# Patient Record
Sex: FEMALE | Race: WHITE | Employment: FULL TIME | URBAN - METROPOLITAN AREA
[De-identification: names, ages, dates, MRNs, and addresses within clinical notes are randomized per-mention and may not be internally consistent; named-entity substitution may affect disease eponyms.]

---

## 2018-07-02 PROBLEM — R01.1 HEART MURMUR: Status: ACTIVE | Noted: 2018-07-02

## 2018-07-02 PROBLEM — F41.9 ANXIETY AND DEPRESSION: Status: ACTIVE | Noted: 2018-07-02

## 2018-07-02 PROBLEM — F32.A ANXIETY AND DEPRESSION: Status: ACTIVE | Noted: 2018-07-02

## 2018-07-02 PROBLEM — Z34.90 PREGNANCY: Status: ACTIVE | Noted: 2018-07-02

## 2018-07-31 PROBLEM — K12.1 MOUTH ULCERS: Status: ACTIVE | Noted: 2018-07-31

## 2018-08-03 PROBLEM — B00.9 HERPES SIMPLEX TYPE 1 INFECTION: Status: ACTIVE | Noted: 2018-08-03

## 2018-12-12 ENCOUNTER — HOSPITAL ENCOUNTER (OUTPATIENT)
Age: 28
Discharge: HOME | End: 2018-12-12
Attending: OBSTETRICS & GYNECOLOGY | Admitting: OBSTETRICS & GYNECOLOGY
Payer: COMMERCIAL

## 2018-12-12 ENCOUNTER — APPOINTMENT (OUTPATIENT)
Dept: ULTRASOUND IMAGING | Age: 28
End: 2018-12-12
Attending: OBSTETRICS & GYNECOLOGY
Payer: COMMERCIAL

## 2018-12-12 VITALS
HEIGHT: 64 IN | TEMPERATURE: 97.9 F | HEART RATE: 127 BPM | RESPIRATION RATE: 112 BRPM | WEIGHT: 145 LBS | SYSTOLIC BLOOD PRESSURE: 144 MMHG | OXYGEN SATURATION: 99 % | BODY MASS INDEX: 24.75 KG/M2 | DIASTOLIC BLOOD PRESSURE: 66 MMHG

## 2018-12-12 DIAGNOSIS — R10.9 FLANK PAIN, ACUTE: Primary | ICD-10-CM

## 2018-12-12 LAB
ALBUMIN SERPL-MCNC: 2.7 G/DL (ref 3.5–5)
ALBUMIN/GLOB SERPL: 0.7 {RATIO}
ALP SERPL-CCNC: 126 U/L (ref 50–130)
ALT SERPL-CCNC: 12 U/L (ref 12–65)
ANION GAP SERPL CALC-SCNC: 13 MMOL/L
APPEARANCE UR: ABNORMAL
AST SERPL-CCNC: 21 U/L (ref 15–37)
BACTERIA URNS QL MICRO: ABNORMAL /HPF
BASOPHILS # BLD: 0 K/UL (ref 0–0.2)
BASOPHILS NFR BLD: 0 % (ref 0–2)
BILIRUB SERPL-MCNC: 0.3 MG/DL (ref 0.2–1.1)
BILIRUB UR QL: NEGATIVE
BUN SERPL-MCNC: 6 MG/DL (ref 6–23)
CALCIUM SERPL-MCNC: 8.7 MG/DL (ref 8.3–10.4)
CHLORIDE SERPL-SCNC: 108 MMOL/L (ref 98–107)
CO2 SERPL-SCNC: 17 MMOL/L (ref 21–32)
COLOR UR: YELLOW
CREAT SERPL-MCNC: 0.5 MG/DL (ref 0.6–1)
DIFFERENTIAL METHOD BLD: ABNORMAL
EOSINOPHIL # BLD: 0.2 K/UL (ref 0–0.8)
EOSINOPHIL NFR BLD: 2 % (ref 0.5–7.8)
EPI CELLS #/AREA URNS HPF: ABNORMAL /HPF
ERYTHROCYTE [DISTWIDTH] IN BLOOD BY AUTOMATED COUNT: 11.9 % (ref 11.9–14.6)
GLOBULIN SER CALC-MCNC: 3.9 G/DL (ref 2.3–3.5)
GLUCOSE SERPL-MCNC: 75 MG/DL (ref 65–100)
GLUCOSE UR STRIP.AUTO-MCNC: NEGATIVE MG/DL
GLUCOSE, GLUUPC: NEGATIVE
HCT VFR BLD AUTO: 31.8 % (ref 35.8–46.3)
HGB BLD-MCNC: 10.8 G/DL (ref 11.7–15.4)
HGB UR QL STRIP: ABNORMAL
IMM GRANULOCYTES # BLD: 0.1 K/UL (ref 0–0.5)
IMM GRANULOCYTES NFR BLD AUTO: 1 % (ref 0–5)
KETONES UR QL STRIP.AUTO: 15 MG/DL
KETONES UR-MCNC: NORMAL MG/DL
LEUKOCYTE ESTERASE UR QL STRIP.AUTO: NEGATIVE
LYMPHOCYTES # BLD: 1.3 K/UL (ref 0.5–4.6)
LYMPHOCYTES NFR BLD: 12 % (ref 13–44)
MCH RBC QN AUTO: 30 PG (ref 26.1–32.9)
MCHC RBC AUTO-ENTMCNC: 34 G/DL (ref 31.4–35)
MCV RBC AUTO: 88.3 FL (ref 79.6–97.8)
MONOCYTES # BLD: 0.9 K/UL (ref 0.1–1.3)
MONOCYTES NFR BLD: 8 % (ref 4–12)
MUCOUS THREADS URNS QL MICRO: ABNORMAL /LPF
NEUTS SEG # BLD: 8.5 K/UL (ref 1.7–8.2)
NEUTS SEG NFR BLD: 78 % (ref 43–78)
NITRITE UR QL STRIP.AUTO: NEGATIVE
NRBC # BLD: 0 K/UL (ref 0–0.2)
PH UR STRIP: 6 [PH] (ref 5–9)
PLATELET # BLD AUTO: 220 K/UL (ref 150–450)
PMV BLD AUTO: 9.6 FL (ref 9.4–12.3)
POTASSIUM SERPL-SCNC: 3.7 MMOL/L (ref 3.5–5.1)
PROT SERPL-MCNC: 6.6 G/DL
PROT UR QL: NORMAL
PROT UR STRIP-MCNC: ABNORMAL MG/DL
RBC # BLD AUTO: 3.6 M/UL (ref 4.05–5.2)
RBC #/AREA URNS HPF: ABNORMAL /HPF
SODIUM SERPL-SCNC: 138 MMOL/L (ref 136–145)
SP GR UR REFRACTOMETRY: 1.01 (ref 1–1.02)
UROBILINOGEN UR QL STRIP.AUTO: 0.2 EU/DL (ref 0.2–1)
WBC # BLD AUTO: 10.9 K/UL (ref 4.3–11.1)
WBC URNS QL MICRO: ABNORMAL /HPF

## 2018-12-12 PROCEDURE — 81001 URINALYSIS AUTO W/SCOPE: CPT

## 2018-12-12 PROCEDURE — 75810000275 HC EMERGENCY DEPT VISIT NO LEVEL OF CARE: Performed by: EMERGENCY MEDICINE

## 2018-12-12 PROCEDURE — 81002 URINALYSIS NONAUTO W/O SCOPE: CPT | Performed by: OBSTETRICS & GYNECOLOGY

## 2018-12-12 PROCEDURE — 85025 COMPLETE CBC W/AUTO DIFF WBC: CPT

## 2018-12-12 PROCEDURE — 76700 US EXAM ABDOM COMPLETE: CPT

## 2018-12-12 PROCEDURE — 59025 FETAL NON-STRESS TEST: CPT

## 2018-12-12 PROCEDURE — 80053 COMPREHEN METABOLIC PANEL: CPT

## 2018-12-12 PROCEDURE — 76815 OB US LIMITED FETUS(S): CPT

## 2018-12-12 PROCEDURE — 96375 TX/PRO/DX INJ NEW DRUG ADDON: CPT

## 2018-12-12 PROCEDURE — 74011250636 HC RX REV CODE- 250/636: Performed by: OBSTETRICS & GYNECOLOGY

## 2018-12-12 PROCEDURE — 99285 EMERGENCY DEPT VISIT HI MDM: CPT

## 2018-12-12 PROCEDURE — 96361 HYDRATE IV INFUSION ADD-ON: CPT

## 2018-12-12 PROCEDURE — 96374 THER/PROPH/DIAG INJ IV PUSH: CPT

## 2018-12-12 RX ORDER — BUTORPHANOL TARTRATE 1 MG/ML
2 INJECTION INTRAMUSCULAR; INTRAVENOUS
Status: DISCONTINUED | OUTPATIENT
Start: 2018-12-12 | End: 2018-12-12 | Stop reason: HOSPADM

## 2018-12-12 RX ORDER — OXYCODONE HYDROCHLORIDE 5 MG/1
5 TABLET ORAL
Qty: 12 TAB | Refills: 0 | Status: SHIPPED | OUTPATIENT
Start: 2018-12-12 | End: 2019-02-06 | Stop reason: CLARIF

## 2018-12-12 RX ORDER — ONDANSETRON 2 MG/ML
4 INJECTION INTRAMUSCULAR; INTRAVENOUS
Status: DISCONTINUED | OUTPATIENT
Start: 2018-12-12 | End: 2018-12-12 | Stop reason: HOSPADM

## 2018-12-12 RX ORDER — PROMETHAZINE HYDROCHLORIDE 12.5 MG/1
12.5 TABLET ORAL
Qty: 12 TAB | Refills: 0 | Status: SHIPPED | OUTPATIENT
Start: 2018-12-12 | End: 2019-02-09

## 2018-12-12 RX ADMIN — SODIUM CHLORIDE, SODIUM LACTATE, POTASSIUM CHLORIDE, AND CALCIUM CHLORIDE 1000 ML: 600; 310; 30; 20 INJECTION, SOLUTION INTRAVENOUS at 12:30

## 2018-12-12 RX ADMIN — BUTORPHANOL TARTRATE 2 MG: 1 INJECTION, SOLUTION INTRAMUSCULAR; INTRAVENOUS at 12:36

## 2018-12-12 RX ADMIN — ONDANSETRON 4 MG: 2 INJECTION INTRAMUSCULAR; INTRAVENOUS at 12:36

## 2018-12-12 NOTE — DISCHARGE INSTRUCTIONS
If you experience any of the following symptoms bright red vaginal bleeding, gush of fluid, decreased fetal movement, contractions ten minutes or less or any other concern, please follow up with [address] [place] [address] Delivery or physicians office. Pregnancy Precautions: Care Instructions  Your Care Instructions    There is no sure way to prevent labor before your due date ( labor) or to prevent most other pregnancy problems. But there are things you can do to increase your chances of a healthy pregnancy. Go to your appointments, follow your doctor's advice, and take good care of yourself. Eat well, and exercise (if your doctor agrees). And make sure to drink plenty of water. Follow-up care is a key part of your treatment and safety. Be sure to make and go to all appointments, and call your doctor if you are having problems. It's also a good idea to know your test results and keep a list of the medicines you take. How can you care for yourself at home? · Make sure you go to your prenatal appointments. At each visit, your doctor will check your blood pressure. Your doctor will also check to see if you have protein in your urine. High blood pressure and protein in urine are signs of preeclampsia. This condition can be dangerous for you and your baby. · Drink plenty of fluids, enough so that your urine is light yellow or clear like water. Dehydration can cause contractions. If you have kidney, heart, or liver disease and have to limit fluids, talk with your doctor before you increase the amount of fluids you drink. · Tell your doctor right away if you notice any symptoms of an infection, such as:  ? Burning when you urinate. ? A foul-smelling discharge from your vagina. ? Vaginal itching. ? Unexplained fever. ? Unusual pain or soreness in your uterus or lower belly. · Eat a balanced diet. Include plenty of foods that are high in calcium and iron. ?  Foods high in calcium include milk, cheese, yogurt, almonds, and broccoli. ? Foods high in iron include red meat, shellfish, poultry, eggs, beans, raisins, whole-grain bread, and leafy green vegetables. · Do not smoke. If you need help quitting, talk to your doctor about stop-smoking programs and medicines. These can increase your chances of quitting for good. · Do not drink alcohol or use illegal drugs. · Follow your doctor's directions about activity. Your doctor will let you know how much, if any, exercise you can do. · Ask your doctor if you can have sex. If you are at risk for early labor, your doctor may ask you to not have sex. · Take care to prevent falls. During pregnancy, your joints are loose, and your balance is off. Sports such as bicycling, skiing, or in-line skating can increase your risk of falling. And don't ride horses or motorcycles, dive, water ski, scuba dive, or parachute jump while you are pregnant. · Avoid getting very hot. Do not use saunas or hot tubs. Avoid staying out in the sun in hot weather for long periods. Take acetaminophen (Tylenol) to lower a high fever. · Do not take any over-the-counter or herbal medicines or supplements without talking to your doctor or pharmacist first.  When should you call for help? Call 911 anytime you think you may need emergency care. For example, call if:    · You passed out (lost consciousness).     · You have severe vaginal bleeding.     · You have severe pain in your belly or pelvis.     · You have had fluid gushing or leaking from your vagina and you know or think the umbilical cord is bulging into your vagina. If this happens, immediately get down on your knees so your rear end (buttocks) is higher than your head. This will decrease the pressure on the cord until help arrives.   [facility] your doctor now or seek immediate medical care if:    · You have signs of preeclampsia, such as:  ? Sudden swelling of your face, hands, or feet.   ? New vision problems (such as dimness or blurring). ? A severe headache.     · You have any vaginal bleeding.     · You have belly pain or cramping.     · You have a fever.     · You have had regular contractions (with or without pain) for an hour. This means that you have 8 or more within 1 hour or 4 or more in 20 minutes after you change your position and drink fluids.     · You have a sudden release of fluid from your vagina.     · You have low back pain or pelvic pressure that does not go away.     · You notice that your baby has stopped moving or is moving much less than normal.    Watch closely for changes in your health, and be sure to contact your doctor if you have any problems. Where can you learn more? Go to http://dhruv-yogi.info/. Enter [id number] in the search box to learn more about \"Pregnancy Precautions: Care Instructions. \"  Current as of: November 21, 2017  Content Version: 11.8  © 1613-1911 [employer]. Care instructions adapted under license by [employer] (which disclaims liability or warranty for this information). If you have questions about a medical condition or this instruction, always ask your healthcare professional. [place] [address] any warranty or liability for your use of this information. Weeks 32 to 34 of Your Pregnancy: Care Instructions  Your Care Instructions    During the last few weeks of your pregnancy, you may have more aches and pains. It's important to rest when you can. Your growing baby is putting more pressure on your bladder. So you may need to urinate more often. Hemorrhoids are also common. These are painful, itchy veins in the rectal area. In the 36th week, most women have a test for group B streptococcus (GBS). GBS is a common bacteria that can live in the vagina and rectum. It can make your baby sick after birth. If you test positive, you will get antibiotics during labor. These will keep your baby from getting the bacteria.   You may want to talk with your doctor about banking your baby's umbilical cord blood. This is the blood left in the cord after birth. If you want to save this blood, you must arrange it ahead of time. You can't decide at the last minute. If you haven't already had the Tdap shot during this pregnancy, talk to your doctor about getting it. It will help protect your  against pertussis infection. Follow-up care is a key part of your treatment and safety. Be sure to make and go to all appointments, and call your doctor if you are having problems. It's also a good idea to know your test results and keep a list of the medicines you take. How can you care for yourself at home? Ease hemorrhoids  · Get more liquids, fruits, vegetables, and fiber in your diet. This will help keep your stools soft. · Avoid sitting for too long. Lie on your left side several times a day. · Clean yourself with soft, moist toilet paper. Or you can use witch hazel pads or personal hygiene pads. · If you are uncomfortable, try ice packs. Or you can sit in a warm sitz bath. Do these for 20 minutes at a time, as needed. · Use hydrocortisone cream for pain and itching. Two examples are Anusol and Preparation H Hydrocortisone. · Ask your doctor about taking an over-the-counter stool softener. Consider breastfeeding  · Experts recommend that women breastfeed for 1 year or longer. Breast milk is the perfect food for babies. · Breast milk is easier for babies to digest than formula. And it is always available, just the right temperature, and free. · Breast milk may help protect your child from some health problems.  babies are less likely than formula-fed babies to:  ? Get ear infections, colds, diarrhea, and pneumonia. ? Be obese or get diabetes later in life. · Women who breastfeed have less bleeding after the birth. Their uteruses also shrink back faster. · Some women who breastfeed lose weight faster.  Making milk burns calories. · Breastfeeding can lower your risk of breast cancer, ovarian cancer, and osteoporosis. Decide about circumcision for boys  · As you make this decision, it may help to think about your personal, Congregational, and family traditions. You get to decide if you will keep your son's penis natural or if he will be circumcised. · If you decide that you would like to have your baby circumcised, talk with your doctor. You can share your concerns about pain. And you can discuss your preferences for anesthesia. Where can you learn more? Go to http://dhruv-yogi.info/. Enter J554 in the search box to learn more about \"Weeks 32 to 34 of Your Pregnancy: Care Instructions. \"  Current as of: November 21, 2017  Content Version: 11.8  © 8467-6015 [employer]. Care instructions adapted under license by [employer] (which disclaims liability or warranty for this information). If you have questions about a medical condition or this instruction, always ask your healthcare professional. [address] any warranty or liability for your use of this information.

## 2018-12-12 NOTE — PROGRESS NOTES
Discharge instructions given to patient and significant other, verbalize understanding and agreement. Deny any questions, needs or concerns. Patient in no apparent distress and in stable condition. Urine strainer and MD pain prescription given to patient. Patient ambulatory to private vehicle with family at side.

## 2018-12-12 NOTE — PROGRESS NOTES
Dr. Lilo Blum at bedside, MD reviewed POC with patient and significant other, verbalize understanding and agreement. MD orders received for patient to be discharged home with precautions and patient to hydrate and strain urine at home.

## 2018-12-12 NOTE — H&P
Chief Complaint   Patient presents with    Pregnancy Problem    Contractions       34 y.o. female at 31w3d  weeks gestation who is seen for acute onset right flank pain. Began around 8 am with no real inciting event. No vb or lof. Fetal movement has been slightly decreased. Had some diarrhea, vomiting, but has been having those kind of symptoms during the pregnancy. Pain started on the upper right thigh, then spread to the right flank. HISTORY:  OB History    Para Term  AB Living   1             SAB TAB Ectopic Molar Multiple Live Births                    # Outcome Date GA Lbr Sivakumar/2nd Weight Sex Delivery Anes PTL Lv   1 Current                   Social History     Substance and Sexual Activity   Sexual Activity Yes    Partners: Male    Birth control/protection: None     Patient's last menstrual period was 2018.     Social History     Socioeconomic History    Marital status: SINGLE     Spouse name: Not on file    Number of children: Not on file    Years of education: Not on file    Highest education level: Not on file   Social Needs    Financial resource strain: Not on file    Food insecurity - worry: Not on file    Food insecurity - inability: Not on file    Transportation needs - medical: Not on file   [employer] needs - non-medical: Not on file   Occupational History    Not on file   Tobacco Use    Smoking status: Never Smoker    Smokeless tobacco: Never Used   Substance and Sexual Activity    Alcohol use: No    Drug use: No    Sexual activity: Yes     Partners: Male     Birth control/protection: None   Other Topics Concern     Service Not Asked    Blood Transfusions Not Asked    Caffeine Concern Not Asked    Occupational Exposure Not Asked    Hobby Hazards Not Asked    Sleep Concern Not Asked    Stress Concern Not Asked    Weight Concern Not Asked    Special Diet Not Asked    Back Care Not Asked    Exercise Not Asked    Bike Helmet Not Asked    Seat Belt Not Asked    Self-Exams Not Asked   Social History Narrative    Not on file       Past Surgical History:   Procedure Laterality Date    HX WISDOM TEETH EXTRACTION         Past Medical History:   Diagnosis Date    Anxiety     Depression          ROS:  Negative:   negative 10 point ROS except as noted in HPI    Positive:   per hpi    PHYSICAL EXAM:  Blood pressure 144/66, pulse (!) 127, temperature 97.9 °F (36.6 °C), resp. rate (!) 112, height 5' 4\" (1.626 m), weight 65.8 kg (145 lb), last menstrual period 05/06/2018, SpO2 99 %. The patient appears well, alert, oriented x 3. Appropriate affect. Lungs are clear. Heart RRR, no murmurs. Abdomen soft, non-tender, no rebound/guarding. Palpation over the right flank in area of pain causes no change in pain. Fundus soft and non tender  Skin warm, dry, no rashes  Ext no edema, DTR's normal  SSE cervix appears long/closed  Fetal Heart Rate: baseline 120-140  Variability: moderate  Accelerations: yes  Decelerations: none  Uterine contractions: none     OBUS: krishnan vertex iup, normal appearing fluid,   UA: large blood    Assessment:  34 y.o. female at 31w3d flank pain, suspect kidney stone. Plan:  Ivf, pain meds, check labs, us.    Mary Quinn MD    Addendum:  Lab Results   Component Value Date/Time    WBC 10.9 12/12/2018 12:30 PM    HGB 10.8 (L) 12/12/2018 12:30 PM    HCT 31.8 (L) 12/12/2018 12:30 PM    PLATELET 175 88/14/4104 12:30 PM    MCV 88.3 12/12/2018 12:30 PM    Hgb, External 12.1 07/02/2018    Hct, External 36.9 07/02/2018    Platelet cnt., External 260 07/02/2018     Lab Results   Component Value Date/Time    Sodium 138 12/12/2018 12:30 PM    Potassium 3.7 12/12/2018 12:30 PM    Chloride 108 (H) 12/12/2018 12:30 PM    CO2 17 (L) 12/12/2018 12:30 PM    Anion gap 13 12/12/2018 12:30 PM    Glucose 75 12/12/2018 12:30 PM    BUN 6 12/12/2018 12:30 PM    Creatinine 0.50 (L) 12/12/2018 12:30 PM    GFR est AA >60 12/12/2018 12:30 PM GFR est non-AA >60 12/12/2018 12:30 PM    Calcium 8.7 12/12/2018 12:30 PM    Bilirubin, total 0.3 12/12/2018 12:30 PM    AST (SGOT) 21 12/12/2018 12:30 PM    Alk. phosphatase 126 12/12/2018 12:30 PM    Protein, total 6.6 12/12/2018 12:30 PM    Albumin 2.7 (L) 12/12/2018 12:30 PM    Globulin 3.9 (H) 12/12/2018 12:30 PM    A-G Ratio 0.7 12/12/2018 12:30 PM    ALT (SGPT) 12 12/12/2018 12:30 PM     US Results (most recent):  Results from [place] encounter on 12/12/18   US ABD COMP    Narrative Abdominal Ultrasound    INDICATION:  Right flank pain, pregnant    FINDINGS: There are no discrete lesions in the visualized portions of the liver,  pancreas, or spleen. There is no bile duct dilatation. The common bile duct  measures 4 mm. The gallbladder is normal in size and appearance. No gallstones  are seen. There is no wall thickening. The right kidney measures 1.9 cm in length. The left kidney measures 12.0 cm. There is mild right hydronephrosis. There is no evidence of a renal mass. The  appendix is not identified. There is no ascites. The aorta and inferior vena  cava are normal in caliber. Impression IMPRESSION: Mild right hydronephrosis       No evidence of appendicitis. Likely kidney stone. Patient feels much better after pain meds. Will discharge home with pain meds/phenergan. Recommend increasing fluid intake and straining urine. Mary Quinn MD

## 2018-12-12 NOTE — PROGRESS NOTES
1148 Patient arrived from ER to triage with c/o sharp shooting cramping pain in lower right abdomen that migrates to her right back and left lower abdomen. Per patient started around 0800am. Abdomen palpated soft and non-tender. +FM. Patient denies HA, visual disturbances, RUQ pain, nausea, VB, LOF, or UCs. Will notify OB hospitalist of patient arrival.    56 Dr. Celeste Bahena called on phone and notified of triage patient in [address] with c/o sharp shooting cramping pain in lower right abdomen that migrates to right back and left lower abdomen, gestational age, history. MD on way to see patient. 4058 [facility] adjusted frequently    1205 Dr. Celeste Bahena at bedside, strip reviewed by MD. MD orders received for urine dip stick. Urine dip stick resulted, MD notified, no new orders received. 1209 Vaginal speculum performed by Dr. Celeste Bahena, SVE closed/long per MD.    2457 Bedside ultrasound performed by MD.     1215 MD orders received for IV placement, 1000ml LR fluid bolus, renal/abdominal ultrasound, labs, stadol and zofran; see MAR. MD orders received for [facility] and U/S to be d/c.     1230 IV started without complications, blood drawn.

## 2018-12-12 NOTE — ED TRIAGE NOTES
Patient advises that she is 7 months pregnant with first pregnancy and started with abdominal pain around 0800 this morning constant dull pain with sharp pain \"every few minutes\". Patient denies any bleeding or discharge of fluids. Patient with no further complaints.

## 2019-01-08 PROBLEM — Z23 ENCOUNTER FOR IMMUNIZATION: Status: ACTIVE | Noted: 2019-01-08

## 2019-01-29 ENCOUNTER — HOSPITAL ENCOUNTER (OUTPATIENT)
Age: 29
Discharge: HOME | End: 2019-01-29
Attending: OBSTETRICS & GYNECOLOGY | Admitting: OBSTETRICS & GYNECOLOGY
Payer: COMMERCIAL

## 2019-01-29 VITALS
WEIGHT: 153 LBS | RESPIRATION RATE: 16 BRPM | DIASTOLIC BLOOD PRESSURE: 63 MMHG | HEART RATE: 72 BPM | SYSTOLIC BLOOD PRESSURE: 104 MMHG | BODY MASS INDEX: 26.12 KG/M2 | HEIGHT: 64 IN | TEMPERATURE: 98.5 F

## 2019-01-29 PROBLEM — R10.9 ABDOMINAL PAIN DURING PREGNANCY IN THIRD TRIMESTER: Status: ACTIVE | Noted: 2019-01-29

## 2019-01-29 PROBLEM — O26.893 ABDOMINAL PAIN DURING PREGNANCY IN THIRD TRIMESTER: Status: ACTIVE | Noted: 2019-01-29

## 2019-01-29 PROCEDURE — 74011250636 HC RX REV CODE- 250/636: Performed by: OBSTETRICS & GYNECOLOGY

## 2019-01-29 PROCEDURE — 99285 EMERGENCY DEPT VISIT HI MDM: CPT

## 2019-01-29 PROCEDURE — 96372 THER/PROPH/DIAG INJ SC/IM: CPT

## 2019-01-29 RX ORDER — BUTORPHANOL TARTRATE 2 MG/ML
1 INJECTION INTRAMUSCULAR; INTRAVENOUS
Status: COMPLETED | OUTPATIENT
Start: 2019-01-29 | End: 2019-01-29

## 2019-01-29 RX ORDER — PROMETHAZINE HYDROCHLORIDE 25 MG/ML
12.5 INJECTION, SOLUTION INTRAMUSCULAR; INTRAVENOUS ONCE
Status: COMPLETED | OUTPATIENT
Start: 2019-01-29 | End: 2019-01-29

## 2019-01-29 RX ADMIN — PROMETHAZINE HYDROCHLORIDE 12.5 MG: 25 INJECTION INTRAMUSCULAR; INTRAVENOUS at 15:38

## 2019-01-29 RX ADMIN — BUTORPHANOL TARTRATE 1 MG: 2 INJECTION, SOLUTION INTRAMUSCULAR; INTRAVENOUS at 15:38

## 2019-01-29 NOTE — DISCHARGE INSTRUCTIONS
Patient Education        Pregnancy Precautions: Care Instructions  Your Care Instructions    There is no sure way to prevent labor before your due date ( labor) or to prevent most other pregnancy problems. But there are things you can do to increase your chances of a healthy pregnancy. Go to your appointments, follow your doctor's advice, and take good care of yourself. Eat well, and exercise (if your doctor agrees). And make sure to drink plenty of water. Follow-up care is a key part of your treatment and safety. Be sure to make and go to all appointments, and call your doctor if you are having problems. It's also a good idea to know your test results and keep a list of the medicines you take. How can you care for yourself at home? · Make sure you go to your prenatal appointments. At each visit, your doctor will check your blood pressure. Your doctor will also check to see if you have protein in your urine. High blood pressure and protein in urine are signs of preeclampsia. This condition can be dangerous for you and your baby. · Drink plenty of fluids, enough so that your urine is light yellow or clear like water. Dehydration can cause contractions. If you have kidney, heart, or liver disease and have to limit fluids, talk with your doctor before you increase the amount of fluids you drink. · Tell your doctor right away if you notice any symptoms of an infection, such as:  ? Burning when you urinate. ? A foul-smelling discharge from your vagina. ? Vaginal itching. ? Unexplained fever. ? Unusual pain or soreness in your uterus or lower belly. · Eat a balanced diet. Include plenty of foods that are high in calcium and iron. ? Foods high in calcium include milk, cheese, yogurt, almonds, and broccoli. ? Foods high in iron include red meat, shellfish, poultry, eggs, beans, raisins, whole-grain bread, and leafy green vegetables. · Do not smoke.  If you need help quitting, talk to your doctor about stop-smoking programs and medicines. These can increase your chances of quitting for good. · Do not drink alcohol or use illegal drugs. · Follow your doctor's directions about activity. Your doctor will let you know how much, if any, exercise you can do. · Ask your doctor if you can have sex. If you are at risk for early labor, your doctor may ask you to not have sex. · Take care to prevent falls. During pregnancy, your joints are loose, and your balance is off. Sports such as bicycling, skiing, or in-line skating can increase your risk of falling. And don't ride horses or motorcycles, dive, water ski, scuba dive, or parachute jump while you are pregnant. · Avoid getting very hot. Do not use saunas or hot tubs. Avoid staying out in the sun in hot weather for long periods. Take acetaminophen (Tylenol) to lower a high fever. · Do not take any over-the-counter or herbal medicines or supplements without talking to your doctor or pharmacist first.  When should you call for help? Call 911 anytime you think you may need emergency care. For example, call if:    · You passed out (lost consciousness).     · You have severe vaginal bleeding.     · You have severe pain in your belly or pelvis.     · You have had fluid gushing or leaking from your vagina and you know or think the umbilical cord is bulging into your vagina. If this happens, immediately get down on your knees so your rear end (buttocks) is higher than your head. This will decrease the pressure on the cord until help arrives.   [facility] your doctor now or seek immediate medical care if:    · You have signs of preeclampsia, such as:  ? Sudden swelling of your face, hands, or feet. ? New vision problems (such as dimness or blurring). ? A severe headache.     · You have any vaginal bleeding.     · You have belly pain or cramping.     · You have a fever.     · You have had regular contractions (with or without pain) for an hour.  This means that you have 8 or more within 1 hour or 4 or more in 20 minutes after you change your position and drink fluids.     · You have a sudden release of fluid from your vagina.     · You have low back pain or pelvic pressure that does not go away.     · You notice that your baby has stopped moving or is moving much less than normal.    Watch closely for changes in your health, and be sure to contact your doctor if you have any problems. Where can you learn more? Go to http://dhruv-yogi.info/. Enter [id number] in the search box to learn more about \"Pregnancy Precautions: Care Instructions. \"  Current as of: September 5, 2018  Content Version: 11.9  © 1362-9957 [employer]. Care instructions adapted under license by [employer] (which disclaims liability or warranty for this information). If you have questions about a medical condition or this instruction, always ask your healthcare professional. [address] any warranty or liability for your use of this information.

## 2019-01-29 NOTE — PROGRESS NOTES
1/29/2019 RE: Simone Cadena To Whom it May Concern: This is to certify that Simone Cadena may may return to work on 1/31/19. Please feel free to contact my office if you have any questions or concerns. Thank you for your assistance in this matter. Sincerely, [facility] staff

## 2019-01-29 NOTE — PROGRESS NOTES
Dr Evelyne Syed to the room, MAURICIO no change noted per MD. Orders to discharge to home with a work note for today and tomorrow.

## 2019-01-29 NOTE — PROGRESS NOTES
Pt discharged to home with precautions, pt verbalized understanding and denies needs or questions at this time. Pt taken via WC to private vehicle, stable at discharge undelivered.

## 2019-01-29 NOTE — PROGRESS NOTES
Pt to triage with complaints of contractions that started at 0300 and became consistent around 1100. Pain 6/10.

## 2019-01-29 NOTE — ED PROVIDER NOTES
Chief Complaint:painful contractions 34 y.o. female G1 at 38w2d 
weeks gestation who is seen for moderate abdominal pain. Pt reports painful contractions since 3:00 am becoming stronger around 10:00 am. Reports contractions are regular every 2-3 minutes. No loss of fluid. No vag bleeding. Good fetal movement. HISTORY: 
 
Social History Substance and Sexual Activity Sexual Activity Yes  Partners: Male  Birth control/protection: None Patient's last menstrual period was 05/06/2018. Social History Socioeconomic History  Marital status: SINGLE Spouse name: Not on file  Number of children: Not on file  Years of education: Not on file  Highest education level: Not on file Social Needs  Financial resource strain: Not on file  Food insecurity - worry: Not on file  Food insecurity - inability: Not on file  Transportation needs - medical: Not on file  Transportation needs - non-medical: Not on file Occupational History  Not on file Tobacco Use  Smoking status: Never Smoker  Smokeless tobacco: Never Used Substance and Sexual Activity  Alcohol use: No  
 Drug use: No  
 Sexual activity: Yes  
  Partners: Male Birth control/protection: None Other Topics Concern [address] Service Not Asked  Blood Transfusions Not Asked  Caffeine Concern Not Asked  Occupational Exposure Not Asked [place] Hazards Not Asked  Sleep Concern Not Asked  Stress Concern Not Asked  Weight Concern Not Asked  Special Diet Not Asked  Back Care Not Asked  Exercise Not Asked  Bike Helmet Not Asked  Seat Belt Not Asked  Self-Exams Not Asked Social History Narrative  Not on file Past Surgical History:  
Procedure Laterality Date  HX WISDOM TEETH EXTRACTION Past Medical History:  
Diagnosis Date  Anxiety  Depression  Heart abnormality   
 heart murmur ROS: 
 A 12 point review of symptoms negative except for chief complaint as described above. PHYSICAL EXAM: 
Blood pressure 104/63, pulse 72, temperature 98.5 °F (36.9 °C), resp. rate 16, height 5' 4\" (1.626 m), weight 69.4 kg (153 lb), last menstrual period 2018. Constitutional: The patient appears well, alert, oriented x 3. Cardiovascular: Heart RRR, no murmurs. Respiratory: Lungs clear, no respiratory distress GI: Abdomen soft, nontender, no guarding No fundal tenderness Musculoskeletal: no cva tenderness Upper ext: no edema, reflexes +2 Lower ext: no edema, neg taylor's, reflexes +2 Skin: no rashes or lesions Psychiatric:Mood/ Affect: appropriate Genitourinary: SVE:60/-2 (rechecked three times while in triage with only slight cervical change.) FHT:reactive , cat 1 
TOCO:irreg - sometimes irritability, sometimes organized and q 3 I personally reviewed pt's medical record including relevant labs and ultrasounds Assessment/Plan: 
35 yo G1 at 38w2d in early labor with minimal cervical change Home with stadol/ phenergan for therapeutic rest 
Reactive nst 
Pt and Dr. Yousuf Medina discussed plan for delivery. If labor prior to 39 weeks, then plan for vag delivery. If not repeat ultrasound and consider  for size. Further discussion with pt as pregnancy progresses.

## 2019-02-07 ENCOUNTER — ANESTHESIA (OUTPATIENT)
Dept: LABOR AND DELIVERY | Age: 29
End: 2019-02-07
Payer: COMMERCIAL

## 2019-02-07 ENCOUNTER — ANESTHESIA EVENT (OUTPATIENT)
Dept: LABOR AND DELIVERY | Age: 29
End: 2019-02-07
Payer: COMMERCIAL

## 2019-02-07 ENCOUNTER — HOSPITAL ENCOUNTER (INPATIENT)
Age: 29
LOS: 2 days | Discharge: HOME | End: 2019-02-09
Attending: OBSTETRICS & GYNECOLOGY | Admitting: OBSTETRICS & GYNECOLOGY
Payer: COMMERCIAL

## 2019-02-07 PROBLEM — Z34.90 ENCOUNTER FOR PLANNED INDUCTION OF LABOR: Status: ACTIVE | Noted: 2019-02-07

## 2019-02-07 PROBLEM — Z3A.39 39 WEEKS GESTATION OF PREGNANCY: Status: ACTIVE | Noted: 2019-02-07

## 2019-02-07 LAB
ABO + RH BLD: NORMAL
ARTERIAL PATENCY WRIST A: ABNORMAL
ARTERIAL PATENCY WRIST A: ABNORMAL
BASE DEFICIT BLD-SCNC: 5 MMOL/L
BASE DEFICIT BLD-SCNC: 5 MMOL/L
BDY SITE: ABNORMAL
BDY SITE: ABNORMAL
BLOOD GROUP ANTIBODIES SERPL: NORMAL
BODY TEMPERATURE: 98.6
BODY TEMPERATURE: 98.6
CO2 BLD-SCNC: 20 MMOL/L
CO2 BLD-SCNC: 23 MMOL/L
COLLECT TIME,HTIME: 1716
COLLECT TIME,HTIME: 1716
ERYTHROCYTE [DISTWIDTH] IN BLOOD BY AUTOMATED COUNT: 13.2 % (ref 11.9–14.6)
GAS FLOW.O2 O2 DELIVERY SYS: ABNORMAL L/MIN
GAS FLOW.O2 O2 DELIVERY SYS: ABNORMAL L/MIN
HCO3 BLD-SCNC: 21.5 MMOL/L (ref 22–26)
HCO3 BLDV-SCNC: 19.4 MMOL/L (ref 23–28)
HCT VFR BLD AUTO: 33.7 % (ref 35.8–46.3)
HGB BLD-MCNC: 10.8 G/DL (ref 11.7–15.4)
MCH RBC QN AUTO: 29.3 PG (ref 26.1–32.9)
MCHC RBC AUTO-ENTMCNC: 32 G/DL (ref 31.4–35)
MCV RBC AUTO: 91.3 FL (ref 79.6–97.8)
NRBC # BLD: 0 K/UL (ref 0–0.2)
PCO2 BLDCO: 34 MMHG (ref 32–68)
PCO2 BLDCO: 46 MMHG (ref 32–68)
PH BLDCO: 7.28 [PH] (ref 7.15–7.38)
PH BLDCO: 7.37 [PH] (ref 7.15–7.38)
PLATELET # BLD AUTO: 232 K/UL (ref 150–450)
PMV BLD AUTO: 10.1 FL (ref 9.4–12.3)
PO2 BLDCO: 22 MMHG
PO2 BLDCO: 27 MMHG
RBC # BLD AUTO: 3.69 M/UL (ref 4.05–5.2)
SAO2 % BLD: 30 % (ref 95–98)
SAO2 % BLDV: 48 % (ref 65–88)
SERVICE CMNT-IMP: ABNORMAL
SERVICE CMNT-IMP: ABNORMAL
SPECIMEN EXP DATE BLD: NORMAL
SPECIMEN TYPE: ABNORMAL
SPECIMEN TYPE: ABNORMAL
WBC # BLD AUTO: 9.2 K/UL (ref 4.3–11.1)

## 2019-02-07 PROCEDURE — 10907ZC DRAINAGE OF AMNIOTIC FLUID, THERAPEUTIC FROM PRODUCTS OF CONCEPTION, VIA NATURAL OR ARTIFICIAL OPENING: ICD-10-PCS | Performed by: OBSTETRICS & GYNECOLOGY

## 2019-02-07 PROCEDURE — 74011250637 HC RX REV CODE- 250/637: Performed by: OBSTETRICS & GYNECOLOGY

## 2019-02-07 PROCEDURE — 65270000029 HC RM PRIVATE

## 2019-02-07 PROCEDURE — 77030005518 HC CATH URETH FOL 2W BARD -B

## 2019-02-07 PROCEDURE — 77030011943

## 2019-02-07 PROCEDURE — 86900 BLOOD TYPING SEROLOGIC ABO: CPT

## 2019-02-07 PROCEDURE — 77030018846 HC SOL IRR STRL H20 ICUM -A

## 2019-02-07 PROCEDURE — 77030002888 HC SUT CHRMC J&J -A

## 2019-02-07 PROCEDURE — 75410000000 HC DELIVERY VAGINAL/SINGLE

## 2019-02-07 PROCEDURE — 4A1HXCZ MONITORING OF PRODUCTS OF CONCEPTION, CARDIAC RATE, EXTERNAL APPROACH: ICD-10-PCS | Performed by: OBSTETRICS & GYNECOLOGY

## 2019-02-07 PROCEDURE — 77030014125 HC TY EPDRL BBMI -B: Performed by: ANESTHESIOLOGY

## 2019-02-07 PROCEDURE — 3E033VJ INTRODUCTION OF OTHER HORMONE INTO PERIPHERAL VEIN, PERCUTANEOUS APPROACH: ICD-10-PCS | Performed by: OBSTETRICS & GYNECOLOGY

## 2019-02-07 PROCEDURE — 74011250636 HC RX REV CODE- 250/636

## 2019-02-07 PROCEDURE — 74011250636 HC RX REV CODE- 250/636: Performed by: OBSTETRICS & GYNECOLOGY

## 2019-02-07 PROCEDURE — 82803 BLOOD GASES ANY COMBINATION: CPT

## 2019-02-07 PROCEDURE — 75410000003 HC RECOV DEL/VAG/CSECN EA 0.5 HR

## 2019-02-07 PROCEDURE — 85027 COMPLETE CBC AUTOMATED: CPT

## 2019-02-07 PROCEDURE — 76060000078 HC EPIDURAL ANESTHESIA

## 2019-02-07 PROCEDURE — 75410000002 HC LABOR FEE PER 1 HR

## 2019-02-07 PROCEDURE — A4300 CATH IMPL VASC ACCESS PORTAL: HCPCS | Performed by: ANESTHESIOLOGY

## 2019-02-07 PROCEDURE — 36415 COLL VENOUS BLD VENIPUNCTURE: CPT

## 2019-02-07 RX ORDER — LANOLIN ALCOHOL/MO/W.PET/CERES
1 CREAM (GRAM) TOPICAL
Status: DISCONTINUED | OUTPATIENT
Start: 2019-02-08 | End: 2019-02-09 | Stop reason: HOSPADM

## 2019-02-07 RX ORDER — HYDROMORPHONE HYDROCHLORIDE 2 MG/ML
2 INJECTION, SOLUTION INTRAMUSCULAR; INTRAVENOUS; SUBCUTANEOUS
Status: DISCONTINUED | OUTPATIENT
Start: 2019-02-07 | End: 2019-02-09 | Stop reason: HOSPADM

## 2019-02-07 RX ORDER — LIDOCAINE HYDROCHLORIDE 20 MG/ML
JELLY TOPICAL
Status: DISCONTINUED | OUTPATIENT
Start: 2019-02-07 | End: 2019-02-07 | Stop reason: HOSPADM

## 2019-02-07 RX ORDER — HYDROCODONE BITARTRATE AND ACETAMINOPHEN 7.5; 325 MG/1; MG/1
1 TABLET ORAL
Status: DISCONTINUED | OUTPATIENT
Start: 2019-02-07 | End: 2019-02-09 | Stop reason: HOSPADM

## 2019-02-07 RX ORDER — OXYTOCIN/RINGER'S LACTATE 15/250 ML
250 PLASTIC BAG, INJECTION (ML) INTRAVENOUS ONCE
Status: DISCONTINUED | OUTPATIENT
Start: 2019-02-07 | End: 2019-02-07

## 2019-02-07 RX ORDER — METHYLERGONOVINE MALEATE 0.2 MG/1
200 TABLET ORAL EVERY 6 HOURS
Status: COMPLETED | OUTPATIENT
Start: 2019-02-07 | End: 2019-02-08

## 2019-02-07 RX ORDER — SODIUM CHLORIDE 0.9 % (FLUSH) 0.9 %
5-40 SYRINGE (ML) INJECTION EVERY 8 HOURS
Status: DISCONTINUED | OUTPATIENT
Start: 2019-02-07 | End: 2019-02-07

## 2019-02-07 RX ORDER — SODIUM CHLORIDE 0.9 % (FLUSH) 0.9 %
5-40 SYRINGE (ML) INJECTION AS NEEDED
Status: DISCONTINUED | OUTPATIENT
Start: 2019-02-07 | End: 2019-02-07

## 2019-02-07 RX ORDER — OXYTOCIN/RINGER'S LACTATE 30/500 ML
0-25 PLASTIC BAG, INJECTION (ML) INTRAVENOUS
Status: DISCONTINUED | OUTPATIENT
Start: 2019-02-07 | End: 2019-02-07 | Stop reason: HOSPADM

## 2019-02-07 RX ORDER — ZOLPIDEM TARTRATE 5 MG/1
5 TABLET ORAL
Status: DISCONTINUED | OUTPATIENT
Start: 2019-02-07 | End: 2019-02-09 | Stop reason: HOSPADM

## 2019-02-07 RX ORDER — ROPIVACAINE HYDROCHLORIDE 2 MG/ML
INJECTION, SOLUTION EPIDURAL; INFILTRATION; PERINEURAL AS NEEDED
Status: DISCONTINUED | OUTPATIENT
Start: 2019-02-07 | End: 2019-02-07 | Stop reason: HOSPADM

## 2019-02-07 RX ORDER — HYDROCODONE BITARTRATE AND ACETAMINOPHEN 7.5; 325 MG/1; MG/1
2 TABLET ORAL
Status: DISCONTINUED | OUTPATIENT
Start: 2019-02-07 | End: 2019-02-09 | Stop reason: HOSPADM

## 2019-02-07 RX ORDER — HYDROMORPHONE HYDROCHLORIDE 2 MG/ML
1 INJECTION, SOLUTION INTRAMUSCULAR; INTRAVENOUS; SUBCUTANEOUS
Status: DISCONTINUED | OUTPATIENT
Start: 2019-02-07 | End: 2019-02-09 | Stop reason: HOSPADM

## 2019-02-07 RX ORDER — SIMETHICONE 80 MG
80 TABLET,CHEWABLE ORAL
Status: DISCONTINUED | OUTPATIENT
Start: 2019-02-07 | End: 2019-02-09 | Stop reason: HOSPADM

## 2019-02-07 RX ORDER — IBUPROFEN 800 MG/1
800 TABLET ORAL
Status: DISCONTINUED | OUTPATIENT
Start: 2019-02-07 | End: 2019-02-09 | Stop reason: HOSPADM

## 2019-02-07 RX ORDER — DIPHENHYDRAMINE HCL 25 MG
25 CAPSULE ORAL
Status: DISCONTINUED | OUTPATIENT
Start: 2019-02-07 | End: 2019-02-09 | Stop reason: HOSPADM

## 2019-02-07 RX ORDER — DEXTROSE, SODIUM CHLORIDE, SODIUM LACTATE, POTASSIUM CHLORIDE, AND CALCIUM CHLORIDE 5; .6; .31; .03; .02 G/100ML; G/100ML; G/100ML; G/100ML; G/100ML
125 INJECTION, SOLUTION INTRAVENOUS CONTINUOUS
Status: DISCONTINUED | OUTPATIENT
Start: 2019-02-07 | End: 2019-02-07

## 2019-02-07 RX ORDER — BUTORPHANOL TARTRATE 2 MG/ML
1 INJECTION INTRAMUSCULAR; INTRAVENOUS
Status: DISCONTINUED | OUTPATIENT
Start: 2019-02-07 | End: 2019-02-07 | Stop reason: HOSPADM

## 2019-02-07 RX ORDER — ROPIVACAINE HYDROCHLORIDE 2 MG/ML
INJECTION, SOLUTION EPIDURAL; INFILTRATION; PERINEURAL
Status: DISCONTINUED | OUTPATIENT
Start: 2019-02-07 | End: 2019-02-07 | Stop reason: HOSPADM

## 2019-02-07 RX ORDER — NALOXONE HYDROCHLORIDE 0.4 MG/ML
0.4 INJECTION, SOLUTION INTRAMUSCULAR; INTRAVENOUS; SUBCUTANEOUS AS NEEDED
Status: DISCONTINUED | OUTPATIENT
Start: 2019-02-07 | End: 2019-02-09 | Stop reason: HOSPADM

## 2019-02-07 RX ORDER — LIDOCAINE HYDROCHLORIDE 10 MG/ML
1 INJECTION INFILTRATION; PERINEURAL
Status: DISCONTINUED | OUTPATIENT
Start: 2019-02-07 | End: 2019-02-07 | Stop reason: HOSPADM

## 2019-02-07 RX ORDER — MINERAL OIL
120 OIL (ML) ORAL
Status: COMPLETED | OUTPATIENT
Start: 2019-02-07 | End: 2019-02-07

## 2019-02-07 RX ADMIN — SODIUM CHLORIDE, SODIUM LACTATE, POTASSIUM CHLORIDE, CALCIUM CHLORIDE, AND DEXTROSE MONOHYDRATE 125 ML/HR: 600; 310; 30; 20; 5 INJECTION, SOLUTION INTRAVENOUS at 15:04

## 2019-02-07 RX ADMIN — SODIUM CHLORIDE, SODIUM LACTATE, POTASSIUM CHLORIDE, CALCIUM CHLORIDE, AND DEXTROSE MONOHYDRATE 125 ML/HR: 600; 310; 30; 20; 5 INJECTION, SOLUTION INTRAVENOUS at 07:30

## 2019-02-07 RX ADMIN — Medication 2 MILLI-UNITS/MIN: at 08:08

## 2019-02-07 RX ADMIN — ROPIVACAINE HYDROCHLORIDE 10 ML: 2 INJECTION, SOLUTION EPIDURAL; INFILTRATION; PERINEURAL at 11:11

## 2019-02-07 RX ADMIN — ROPIVACAINE HYDROCHLORIDE 10 ML/HR: 2 INJECTION, SOLUTION EPIDURAL; INFILTRATION; PERINEURAL at 11:12

## 2019-02-07 RX ADMIN — WITCH HAZEL 1 PAD: 500 SOLUTION RECTAL; TOPICAL at 20:41

## 2019-02-07 RX ADMIN — IBUPROFEN 800 MG: 800 TABLET, FILM COATED ORAL at 21:47

## 2019-02-07 RX ADMIN — MINERAL OIL 120 ML: 471.95 OIL ORAL at 17:05

## 2019-02-07 RX ADMIN — METHYLERGONOVINE MALEATE 200 MCG: 0.2 TABLET ORAL at 20:41

## 2019-02-07 NOTE — ANESTHESIA PROCEDURE NOTES
Epidural Block Start time: 2/7/2019 11:03 AM 
End time: 2/7/2019 11:10 AM 
Performed by: Peter Berger MD 
Authorized by: Peter Berger MD  
 
Pre-Procedure Indication: labor epidural   
Preanesthetic Checklist: patient identified, risks and benefits discussed, anesthesia consent, site marked, patient being monitored, timeout performed and anesthesia consent Timeout Time: 11:03 Epidural:  
Patient position:  Seated Prep region:  Lumbar Prep: Patient draped and Chlorhexidine Location:  L3-4 Needle and Epidural Catheter:  
Needle Type:  Tuohy Needle Gauge:  17 G Injection Technique:  Loss of resistance using saline Attempts:  1 Catheter Size:  19 G Catheter at Skin Depth (cm):  9 Depth in Epidural Space (cm):  5 Events: no blood with aspiration, no cerebrospinal fluid with aspiration, no paresthesia and negative aspiration test   
Test Dose:  Lidocaine 1.5% w/ epi and negative Assessment:  
Catheter Secured:  Tegaderm and tape Insertion:  Uncomplicated Patient tolerance:  Patient tolerated the procedure well with no immediate complications

## 2019-02-07 NOTE — H&P
History & Physical 
 
Name: Luke Jorgensen MRN: [id number]  SSN: [id number] YOB: 1990  Age: 34 y.o. Sex: female Subjective:  
 
Estimated Date of Delivery: 2/10/19 OB History  Para Term  AB Living 1 SAB TAB Ectopic Molar Multiple Live Births # Outcome Date GA Lbr Sivakumar/2nd Weight Sex Delivery Anes PTL Lv  
1 Current Ms. Chely Danielle is admitted with pregnancy at 39w4d for induction of labor due to favorable cervix at term. Prenatal course was normal.  Please see prenatal records for details. Past Medical History:  
Diagnosis Date  Anxiety  Depression  Heart abnormality   
 heart murmur No past surgical history on file. Social History Occupational History  Not on file Tobacco Use  Smoking status: Never Smoker  Smokeless tobacco: Never Used Substance and Sexual Activity  Alcohol use: No  
 Drug use: No  
 Sexual activity: Yes  
  Partners: Male Birth control/protection: None Family History Problem Relation Age of Onset  Heart Disease Father  Breast Cancer Maternal Grandmother  Heart Attack Maternal Grandfather  Diabetes Paternal Grandmother No Known Allergies Prior to Admission medications Medication Sig Start Date End Date Taking? Authorizing Provider  
promethazine (PHENERGAN) 12.5 mg tablet Take 1 Tab by mouth every six (6) hours as needed for Nausea. 18   Brynn Quinn MD  
prenatal 47/iron/folate 1/dha (PNV-DHA PO) Take  by mouth. Provider, Historical  
  
 
Review of Systems Objective:  
 
Vitals: There were no vitals filed for this visit. Physical Exam   
 
Cervical Exam: 2 cm dilated 80% effaced   
-1 station Membranes: Artificial Rupture of Membranes; Amniotic Fluid: small amount of clear fluid Fetal Heart Rate: Reactive Prenatal Labs: 
 
Lab Results Component Value Date/Time ABO/Rh(D) O POSITIVE 02/07/2019 07:26 AM  
 Rubella, External immune 07/02/2018 GrBStrep, External negative 01/16/2019 HBsAg, External negative 07/02/2018 HIV, External NR 07/02/2018 RPR, External NR 07/02/2018 ABO,Rh O positive 07/02/2018 Impression/Plan: Active Problems: 
  39 weeks gestation of pregnancy (2/7/2019) Encounter for planned induction of labor (2/7/2019) Plan: Admit for induction of labor. Group B Strep negative. Signed By:  Christy Tierney MD   
 February 7, 2019

## 2019-02-07 NOTE — ANESTHESIA PREPROCEDURE EVALUATION
Anesthetic History No history of anesthetic complications Review of Systems / Medical History Patient summary reviewed and pertinent labs reviewed Pulmonary Within defined limits Neuro/Psych Psychiatric history (Depression/Anxiety) Cardiovascular Exercise tolerance: >4 METS 
  
GI/Hepatic/Renal 
Within defined limits Endo/Other Within defined limits Other Findings Physical Exam 
 
Airway Mallampati: II 
TM Distance: 4 - 6 cm Neck ROM: normal range of motion Mouth opening: Diminished (comment) Cardiovascular Rhythm: regular Rate: normal 
 
Murmur: Grade 2, Mitral area Dental 
No notable dental hx Pulmonary Breath sounds clear to auscultation Abdominal 
GI exam deferred Other Findings Anesthetic Plan ASA: 2 Anesthesia type: epidural 
 
 
 
 
 
Anesthetic plan and risks discussed with: Patient and Spouse

## 2019-02-07 NOTE — PROGRESS NOTES
Dr. Carol Ann Cheng at bedside; portion of strip reviewed SVE per MD 2/80/-2 AROM; clear fluid Pt may have epidural when desires [facility] [address] once comfortable

## 2019-02-07 NOTE — PROGRESS NOTES
SVE c/c/+2 Dr. Randy Funes given update Orders to begin pushing. Larsen removed; 600 cc clear yellow urine returned

## 2019-02-07 NOTE — PROGRESS NOTES
1713:  Dr. Carito Luque at bedside; pt set in [facility] for vaginal delivery 1716:   viable male, apgars 8/9 wt 8-8 
1723:  Delivery of placenta; pitocin infusion started 1730:  Recovery started

## 2019-02-07 NOTE — PROGRESS NOTES
Pt admitted to 424 for scheduled IOL. Plan of care reviewed with pt and family, verbalizes understanding. Consents signed. IV started on 2nd attempt, blood drawn and sent to lab. IVF's infusing without difficulty.

## 2019-02-08 LAB
BASOPHILS # BLD: 0 K/UL (ref 0–0.2)
BASOPHILS NFR BLD: 0 % (ref 0–2)
DIFFERENTIAL METHOD BLD: ABNORMAL
EOSINOPHIL # BLD: 0.1 K/UL (ref 0–0.8)
EOSINOPHIL NFR BLD: 1 % (ref 0.5–7.8)
ERYTHROCYTE [DISTWIDTH] IN BLOOD BY AUTOMATED COUNT: 13.3 % (ref 11.9–14.6)
HCT VFR BLD AUTO: 32.2 % (ref 35.8–46.3)
HGB BLD-MCNC: 10.4 G/DL (ref 11.7–15.4)
IMM GRANULOCYTES # BLD AUTO: 0.1 K/UL (ref 0–0.5)
IMM GRANULOCYTES NFR BLD AUTO: 1 % (ref 0–5)
LYMPHOCYTES # BLD: 1.7 K/UL (ref 0.5–4.6)
LYMPHOCYTES NFR BLD: 14 % (ref 13–44)
MCH RBC QN AUTO: 29.3 PG (ref 26.1–32.9)
MCHC RBC AUTO-ENTMCNC: 32.3 G/DL (ref 31.4–35)
MCV RBC AUTO: 90.7 FL (ref 79.6–97.8)
MONOCYTES # BLD: 1 K/UL (ref 0.1–1.3)
MONOCYTES NFR BLD: 9 % (ref 4–12)
NEUTS SEG # BLD: 8.9 K/UL (ref 1.7–8.2)
NEUTS SEG NFR BLD: 75 % (ref 43–78)
NRBC # BLD: 0 K/UL (ref 0–0.2)
PLATELET # BLD AUTO: 208 K/UL (ref 150–450)
PMV BLD AUTO: 10.2 FL (ref 9.4–12.3)
RBC # BLD AUTO: 3.55 M/UL (ref 4.05–5.2)
WBC # BLD AUTO: 11.8 K/UL (ref 4.3–11.1)

## 2019-02-08 PROCEDURE — 65270000029 HC RM PRIVATE

## 2019-02-08 PROCEDURE — 36415 COLL VENOUS BLD VENIPUNCTURE: CPT

## 2019-02-08 PROCEDURE — 74011250637 HC RX REV CODE- 250/637: Performed by: OBSTETRICS & GYNECOLOGY

## 2019-02-08 PROCEDURE — 85025 COMPLETE CBC W/AUTO DIFF WBC: CPT

## 2019-02-08 RX ADMIN — IBUPROFEN 800 MG: 800 TABLET, FILM COATED ORAL at 10:34

## 2019-02-08 RX ADMIN — METHYLERGONOVINE MALEATE 200 MCG: 0.2 TABLET ORAL at 08:42

## 2019-02-08 RX ADMIN — METHYLERGONOVINE MALEATE 200 MCG: 0.2 TABLET ORAL at 02:45

## 2019-02-08 RX ADMIN — FERROUS SULFATE TAB 325 MG (65 MG ELEMENTAL FE) 325 MG: 325 (65 FE) TAB at 08:42

## 2019-02-08 RX ADMIN — IBUPROFEN 800 MG: 800 TABLET, FILM COATED ORAL at 20:45

## 2019-02-08 RX ADMIN — METHYLERGONOVINE MALEATE 200 MCG: 0.2 TABLET ORAL at 14:31

## 2019-02-08 RX ADMIN — HYDROCODONE BITARTRATE AND ACETAMINOPHEN 1 TABLET: 7.5; 325 TABLET ORAL at 14:31

## 2019-02-08 NOTE — PROGRESS NOTES
SBAR report received from Cesar Miranda RN. Patient care assumed. Shift assessment complete, see flowsheet for complete assessment. Questions answered accordingly. Deny any needs or concerns. Patient resting in bed with significant other at bedside. Call light within reach. Encouraged to call prn-verbalizes understanding.

## 2019-02-08 NOTE — PROGRESS NOTES
Patient up to bathroom with RN assistance. Tanisha-care taught and completed. Questions encouraged and answered. Patient ambulating without difficulty, encouraged to call for needs or concerns. Verbalizes understanding.

## 2019-02-08 NOTE — PROGRESS NOTES
Post-Partum Day Number 1 Progress Note Patient doing well post-partum without significant complaint. Voiding without difficulty, normal lochia. Vitals:   
Patient Vitals for the past 8 hrs: 
 BP Temp Pulse Resp SpO2  
19 0731 102/57 97.9 °F (36.6 °C) 73 16 98 % Temp (24hrs), Av.3 °F (36.8 °C), Min:97.8 °F (36.6 °C), Max:98.8 °F (37.1 °C) Vital signs stable, afebrile. Exam:  Patient without distress. Abdomen soft, fundus firm at level of umbilicus, nontender Perineum with normal lochia noted. Lower extremities are negative for swelling, cords or tenderness. Lab/Data Review: CBC:  
Lab Results Component Value Date/Time WBC 11.8 (H) 2019 07:15 AM  
 HGB 10.4 (L) 2019 07:15 AM  
 HCT 32.2 (L) 2019 07:15 AM  
  2019 07:15 AM  
 
 
Assessment and Plan:  Patient appears to be having uncomplicated post-partum course. Continue routine perineal care and maternal education. Plan discharge tomorrow if no problems occur.

## 2019-02-08 NOTE — LACTATION NOTE
This note was copied from a baby's chart. Mom called out for feeding assistance. Baby awake and rooting. Assisted mom on left breast in cross cradle hold. Reviewed supportive hold and bringing baby onto breast to ensure deep latch. Baby again latched well. Reviewed manual lip flange. Good latch and baby actively feeding at present. Observed x 10 minutes and still nursing now. Doing well. Mom confident.

## 2019-02-08 NOTE — PROGRESS NOTES
SBAR IN Report: Mother Verbal report received from Gisela Silvestre RN on this patient, who is now being transferred from labor and delivery for routine progression of care. The patient is not wearing a green \"Anesthesia-Duramorph\" band. Report consisted of patient's Situation, Background, Assessment and Recommendations (SBAR).  ID bands were compared with the identification form, and verified with the patient and transferring nurse. Information from the SBAR, Kardex, Procedure Summary, Intake/Output, MAR and Recent Results and the [place] Report was reviewed with the transferring nurse; opportunity for questions and clarification provided.

## 2019-02-08 NOTE — LACTATION NOTE

## 2019-02-08 NOTE — LACTATION NOTE
This note was copied from a baby's chart. Lactation visit. First time mom. In to check on feedings. Baby latched at present on right breast in football hold. Good latch and baby actively feeding. Reviewed signs of good latch with mom. Baby doing well. Fed x 25 minutes on right breast, content post feeding. Reviewed feeding expectations for first 24 hours. Watch for feeding cues. Feed on demand. Doing well. Mom will call out for feeding assistance today as needed. Baby has had void and stool.

## 2019-02-08 NOTE — PROGRESS NOTES
Chart reviewed due to first time parent with history of depression/anxiety. Patient confirms history of both depression and anxiety. Per patient, Alina Gipson was over a year ago, but it was bad. \"  Patient states that the depression/anxiety was situational and she just had to remove herself from the situation. Patient was on Prozac at that time, but has not been on any medications during pregnancy. Patient states that she felt emotionally well throughout pregnancy.  provided education on [facility] Postpartum Monitor Home Visit Program.  Family declined referral for home visit.  provided informational packet on  mood disorder education/resources. Family receptive to receiving information and denied any additional needs from . Patient does not have a PCP - list of PCPs provided. Family has this 's contact information should any needs/questions arise. Ashtyn [address]

## 2019-02-08 NOTE — PROGRESS NOTES
SBAR OUT Report: Mother Verbal report given to Mansi Rivera RN (full name & credentials) on this patient, who is now being transferred to MIu (unit) for routine progression of care. The patient is not wearing a green \"Anesthesia-Duramorph\" band. Report consisted of patient's Situation, Background, Assessment and Recommendations (SBAR).  ID bands were compared with the identification form, and verified with the patient and receiving nurse. Information from the SBAR, Procedure Summary, Intake/Output, MAR and Recent Results and the [place] Report was reviewed with the receiving nurse; opportunity for questions and clarification provided. Fundal check with oncoming RN.  v

## 2019-02-08 NOTE — ANESTHESIA POSTPROCEDURE EVALUATION
* No procedures listed *. Anesthesia Post Evaluation Multimodal analgesia: multimodal analgesia used between 6 hours prior to anesthesia start to PACU discharge Patient location during evaluation: bedside Patient participation: complete - patient participated Level of consciousness: awake Pain score: 2 Pain management: adequate Airway patency: patent Anesthetic complications: no 
Cardiovascular status: acceptable Respiratory status: acceptable Hydration status: acceptable Comments: Pt pleased with anesthetic. Appears comfortable. Post anesthesia nausea and vomiting:  none Visit Vitals /60 Pulse 87 Temp 37.1 °C (98.7 °F) Resp 20 Breastfeeding?  Yes

## 2019-02-09 VITALS
RESPIRATION RATE: 16 BRPM | DIASTOLIC BLOOD PRESSURE: 63 MMHG | HEART RATE: 76 BPM | TEMPERATURE: 98.7 F | SYSTOLIC BLOOD PRESSURE: 97 MMHG | OXYGEN SATURATION: 97 %

## 2019-02-09 PROCEDURE — 74011250637 HC RX REV CODE- 250/637: Performed by: OBSTETRICS & GYNECOLOGY

## 2019-02-09 RX ADMIN — FERROUS SULFATE TAB 325 MG (65 MG ELEMENTAL FE) 325 MG: 325 (65 FE) TAB at 09:17

## 2019-02-09 RX ADMIN — IBUPROFEN 800 MG: 800 TABLET, FILM COATED ORAL at 04:25

## 2019-02-09 NOTE — LACTATION NOTE

## 2019-02-09 NOTE — LACTATION NOTE
This note was copied from a baby's chart. Baby just back from circumcision. Encouraged to feed now since he may be sleepy today due to procedure. Assisted with breastfeeding in cross cradle on R.  Baby fed fair, a little fussy. Demonstrated manual lip flange. Encouraged frequent feeding and watch output. Discussed insurance pumping. Encouraged to double pump after some daytime feedings for 10 minutes to help milk come in if desired once home. Give all colostrum. Baby at 8% weight loss, appropriate output in last 24 hours. Ped visit planned for 2-12-19. Demoed straight and curved tip syringe to use if needed.

## 2019-02-09 NOTE — DISCHARGE INSTRUCTIONS
Discharge instruction to follow: Activity: Pelvis rest for 6 weeks     No heavy lifting over 15 lbs for 2 weeks     No driving for 2 weeks     No push/pull motion such as sweeping or vacuuming for 2 weeks     No tub baths for 6 weeks    If using sitz bath continue until comfortable stopping. If using lenny-bottle continue to use until comfortable stopping. Change sanitary pad after each urination or bowel movement. Call MD for the following:      Fever over 101 F; pain not relieved by medication; foul smelling vaginal discharge or an increase in vaginal bleeding. Take medication as prescribed. Follow up with MD as order. Patient Education        After Your Delivery (the Postpartum Period): Care Instructions  Your Care Instructions    Congratulations on the birth of your baby. Like pregnancy, the  period can be a time of excitement, jovani, and exhaustion. You may look at your wondrous little baby and feel happy. You may also be overwhelmed by your new sleep hours and new responsibilities. At first, babies often sleep during the days and are awake at night. They do not have a pattern or routine. They may make sudden gasps, jerk themselves awake, or look like they have crossed eyes. These are all normal, and they may even make you smile. In these first weeks after delivery, try to take good care of yourself. It may take 4 to 6 weeks to feel like yourself again, and possibly longer if you had a  birth. You will likely feel very tired for several weeks. Your days will be full of ups and downs, but lots of jovani as well. Follow-up care is a key part of your treatment and safety. Be sure to make and go to all appointments, and call your doctor if you are having problems. It's also a good idea to know your test results and keep a list of the medicines you take. How can you care for yourself at home?   Take care of your body after delivery  · Use pads instead of tampons for the bloody flow that may last as long as 2 weeks. · Ease cramps with ibuprofen (Advil, Motrin). · Ease soreness of hemorrhoids and the area between your vagina and rectum with ice compresses or witch hazel pads. · Ease constipation by drinking lots of fluid and eating high-fiber foods. Ask your doctor about over-the-counter stool softeners. · Cleanse yourself with a gentle squeeze of warm water from a bottle instead of wiping with toilet paper. · Take a sitz bath in warm water several times a day. · Wear a good nursing bra. Ease sore and swollen breasts with warm, wet washcloths. · If you are not breastfeeding, use ice rather than heat for breast soreness. · Your period may not start for several months if you are breastfeeding. You may bleed more, and longer at first, than you did before you got pregnant. · Wait until you are healed (about 4 to 6 weeks) before you have sexual intercourse. Your doctor will tell you when it is okay to have sex. · Try not to travel with your baby for 5 or 6 weeks. If you take a long car trip, make frequent stops to walk around and stretch. Avoid exhaustion  · Rest every day. Try to nap when your baby naps. · Ask another adult to be with you for a few days after delivery. · Plan for  if you have other children. · Stay flexible so you can eat at odd hours and sleep when you need to. Both you and your baby are making new schedules. · Plan small trips to get out of the house. Change can make you feel less tired. · Ask for help with housework, cooking, and shopping. Remind yourself that your job is to care for your baby. Know about help for postpartum depression  · \"Baby blues\" are common for the first 1 to 2 weeks after birth. You may cry or feel sad or irritable for no reason. · Rest whenever you can. Being tired makes it harder to handle your emotions. · Go for walks with your baby. · Talk to your partner, friends, and family about your feelings.   · If your symptoms last for more than a few weeks, or if you feel very depressed, ask your doctor for help. · Postpartum depression can be treated. Support groups and counseling can help. Sometimes medicine can also help. Stay healthy  · Eat healthy foods so you have more energy and lose extra baby pounds. · If you breastfeed, avoid drugs. If you quit smoking during pregnancy, try to stay smoke-free. If you choose to have a drink now and then, have only one drink, and limit the number of occasions that you have a drink. Wait to breastfeed at least 2 hours after you have a drink to reduce the amount of alcohol the baby may get in the milk. · Start daily exercise after 4 to 6 weeks, but rest when you feel tired. · Learn exercises to tone your belly. Do Kegel exercises to regain strength in your pelvic muscles. You can do these exercises while you stand or sit. ? Squeeze the same muscles you would use to stop your urine. Your belly and thighs should not move. ? Hold the squeeze for 3 seconds, and then relax for 3 seconds. ? Start with 3 seconds. Then add 1 second each week until you are able to squeeze for 10 seconds. ? Repeat the exercise 10 to 15 times for each session. Do three or more sessions each day. · Find a class for new mothers and new babies that has an exercise time. · If you had a  birth, give yourself a bit more time before you exercise, and be careful. When should you call for help? Call 911 anytime you think you may need emergency care. For example, call if:    · You passed out (lost consciousness).    Call your doctor now or seek immediate medical care if:    · You have severe vaginal bleeding.  This means you are passing blood clots and soaking through a pad each hour for 2 or more hours.     · You are dizzy or lightheaded, or you feel like you may faint.     · You have a fever.     · You have new belly pain, or your pain gets worse.    Watch closely for changes in your health, and be sure to contact your doctor if:    · Your vaginal bleeding seems to be getting heavier.     · You have new or worse vaginal discharge.     · You feel sad, anxious, or hopeless for more than a few days.     · You do not get better as expected. Where can you learn more? Go to http://dhruv-yogi.info/. Enter A461 in the search box to learn more about \"After Your Delivery (the Postpartum Period): Care Instructions. \"  Current as of: September 5, 2018  Content Version: 11.9  © 7634-3662 [employer]. Care instructions adapted under license by [facility] (which disclaims liability or warranty for this information). If you have questions about a medical condition or this instruction, always ask your healthcare professional. [address] any warranty or liability for your use of this information.

## 2019-02-09 NOTE — PROGRESS NOTES
Post-Partum Day Number 2 Progress/Discharge Note Patient doing well post-partum without significant complaint. Voiding without difficulty, normal lochia, positive flatus. Vitals:   
Patient Vitals for the past 8 hrs: 
 BP Temp Pulse Resp  
19 0910 97/63 98.7 °F (37.1 °C) 76 16 Temp (24hrs), Av.4 °F (36.9 °C), Min:98 °F (36.7 °C), Max:98.7 °F (37.1 °C) Vital signs stable, afebrile. Exam:  Patient without distress. Abdomen soft, fundus firm at level of umbilicus, non tender Perineum with normal lochia noted. Lower extremities are negative for swelling, cords or tenderness. Lab/Data Review: All lab results for the last 24 hours reviewed. Assessment and Plan:  Patient appears to be having uncomplicated post-partum course. Continue routine perineal care and maternal education. Plan discharge for today with follow up in our office in 6 weeks.

## 2019-02-09 NOTE — DISCHARGE SUMMARY
Obstetrical Discharge Summary     Name: Magdi Mahajan MRN: [id number]  SSN: [id number]    YOB: 1990  Age: 34 y.o. Sex: female      Allergies: Patient has no known allergies. Admit Date: 2019    Discharge Date: 2019     Admitting Physician: Johan Polk MD     Attending Physician:  Tavon Waters MD     * Admission Diagnoses: 39 weeks gestation of pregnancy [Z3A.39]; Encounter for planned induction of labor [Z34.90]    * Discharge Diagnoses:   Information for the patient's :  Karyle Skiff [[id number]]   Delivery of a 8 lb 8.2 oz (3.86 kg) male infant via Vaginal, Spontaneous on 2019 at 5:16 PM  by . Apgars were 8 and 9. Additional Diagnoses:   Hospital Problems as of 2019 Date Reviewed: 2019          Codes Class Noted - Resolved POA    39 weeks gestation of pregnancy ICD-10-CM: Z3A.39  ICD-9-CM: V22.2  2019 - Present Unknown        * (Principal) Encounter for planned induction of labor ICD-10-CM: Z34.90  ICD-9-CM: V22.1  2019 - Present Unknown             Lab Results   Component Value Date/Time    ABO/Rh(D) O POSITIVE 2019 07:26 AM    Rubella, External immune 2018    GrBStrep, External negative 2019    ABO,Rh O positive 2018    There is no immunization history for the selected administration types on file for this patient.     * Procedures:   * No surgery found *      [place]  Depression Scale  I have been able to laugh and see the funny side of things: As much as I always could  I have looked forward with enjoyment to things: As much as I ever did  I have blamed myself unnecessarily when things went wrong: Not very often  I have been anxious or worried for no good reason: No, not at all  I have felt scared or panicky for no very good reason: No, not at all  Things have been getting on top of me: No, I have been coping as well as ever  I have been so unhappy that I have had difficulty sleeping: No, not at all  I have felt sad or miserable: No, not at all  I have been so unhappy that I have been crying: No, never  The thought of harming myself has occurred to me: Never  Total Score: 1    * Discharge Condition: good    * Hospital Course: Normal hospital course following the delivery. * Disposition: Home    Discharge Medications:   Current Discharge Medication List      STOP taking these medications       promethazine (PHENERGAN) 12.5 mg tablet Comments:   Reason for Stopping:         prenatal 47/iron/folate 1/dha (PNV-DHA PO) Comments:   Reason for Stopping:               * Follow-up Care/Patient Instructions:   Activity: No sex for 6 weeks    Wound Care: Keep wound clean and dry    Follow-up Information     Follow up With Specialties Details Why Contact Info    Kenny Chaudhry, [address] [facility], Gynecology, Obstetrics In 2 weeks  [address]  [phone]      Other, MD Vazquez    Patient can only remember the practice name and not the physician             Signed By:  Cathy Garcia DO     February 9, 2019

## 2019-02-09 NOTE — PROGRESS NOTES
02/09/19 8389 Pain Assessment Pain Scale 1 Numeric (0 - 10) Pain Intensity 1 5 Pain Location 1 Abdomen Pain Description 1 Aching;Cramping Pain Intervention(s) 1 Medication (see MAR) PRN Motrin 800 mg for pain

## 2019-02-09 NOTE — PROGRESS NOTES
02/08/19 2048 Pain Assessment Pain Scale 1 Numeric (0 - 10) Pain Intensity 1 4 Pain Location 1 Abdomen;Perineum Pain Description 1 Aching; Sore Pain Intervention(s) 1 Medication (see MAR) PRN Motrin 800 mg administered for pain.

## 2019-02-20 PROBLEM — Z34.90 PREGNANCY: Status: RESOLVED | Noted: 2018-07-02 | Resolved: 2019-02-20

## 2019-02-20 PROBLEM — R10.9 FLANK PAIN, ACUTE: Status: RESOLVED | Noted: 2018-12-12 | Resolved: 2019-02-20

## 2019-03-21 NOTE — PROGRESS NOTES
Lab notified of specimen and lab work sent. Refill request for Omeprazole 40 mg.  LR 8/22/18  LOV 2/25/19  Next appt 5/13/19  Last labs 2/11/19